# Patient Record
(demographics unavailable — no encounter records)

---

## 2025-07-22 NOTE — DISCUSSION/SUMMARY
[FreeTextEntry1] : Patient to follow up in 1 year for annual GYN exam:  Mammogram and bilateral breast US due: 12/2025, Rx given Colonoscopy due: 12/2027 with Stacyeffer Bone density due: 55 or PM Pap ordered Hemoccult ordered  All questions answered, patient agreeable with plan.  I Sallie GARNER am scribing for the presence of Dr. Romero the following sections HISTORY OF PRESENT ILLNESS, PAST MEDICAL/FAMILY/SOCIAL HISTORY; REVIEW OF SYSTEMS; VITAL SIGNS; PHYSICAL EXAM; DISPOSITION.    I personally performed the services described in the documentation, reviewed the documentation recorded by the scribe in my presence and it accurately and completely records my words and actions.

## 2025-07-22 NOTE — PHYSICAL EXAM
[Appropriately responsive] : appropriately responsive [Alert] : alert [No Acute Distress] : no acute distress [No Lymphadenopathy] : no lymphadenopathy [Soft] : soft [Non-tender] : non-tender [Non-distended] : non-distended [No HSM] : No HSM [Oriented x3] : oriented x3 [Examination Of The Breasts] : a normal appearance [No Masses] : no breast masses were palpable [Labia Majora] : normal [Labia Minora] : normal [Normal] : normal [Uterine Adnexae] : normal [Normal rectal exam] : was normal [FreeTextEntry2] : Sallie Sorenson [Occult Blood Positive] : was negative for occult blood analysis

## 2025-07-22 NOTE — HISTORY OF PRESENT ILLNESS
[TextBox_4] : 46 year old female s/p LSH with bilateral salpingectomy for fibroid uterus on 23  presents for annual visit. Reports 1 day spotting monthly. denies GYN complaints at this time. condoms for BC. hx- . c/o intermittent left breast pain. pt also c/o menorrhagia; heavy bleeding on first day of period.

## 2025-07-22 NOTE — PHYSICAL EXAM
[Appropriately responsive] : appropriately responsive [Alert] : alert [No Acute Distress] : no acute distress [No Lymphadenopathy] : no lymphadenopathy [Soft] : soft [Non-tender] : non-tender [Non-distended] : non-distended [No HSM] : No HSM [Oriented x3] : oriented x3 [Examination Of The Breasts] : a normal appearance [No Masses] : no breast masses were palpable [Labia Majora] : normal [Labia Minora] : normal [Normal] : normal [Normal rectal exam] : was normal [Uterine Adnexae] : normal [FreeTextEntry2] : Sallie Sorenson [Occult Blood Positive] : was negative for occult blood analysis